# Patient Record
(demographics unavailable — no encounter records)

---

## 2025-04-11 NOTE — PHYSICAL EXAM
[de-identified] : well-developed male, NAD [de-identified] : NC/AT [de-identified] : Face - nasal bridge incision healing well, c/d/i, no obvious residual lesion present

## 2025-04-11 NOTE — ASSESSMENT
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  Doing well.  - sutures removed, bacitracin x 3 days then daily aquaphor.  - ok to start scar massage and scar creams in 10 days as discussed.  - pathology pending, pt and father aware.  - all their questions were answered - f/u 1 month w/ Dr. Wells for scar check and to discuss if any further excision needed.

## 2025-04-11 NOTE — HISTORY OF PRESENT ILLNESS
[FreeTextEntry1] : 11 yo otherwise healthy M accompanied by his parents who presents for evaluation of skin lesion on the nasal bridge present for the past 4-5 years increasing in size and changing characteristics. No pain, drainage or pruritus. Denies any family h/o skin cancer.   middle school student  Interval hx (12/2/24). Pt here POD#10 s/p office excision of nasal bridge skin lesion. Doing well with no significant pain, f/c or drainage.   Interval hx (1/2/25). Pt presents today accompanied by dad 6 weeks s/p office excision of nasal bridge nevus sebaceous. Doing well with no new issues. Denies any pain, f/c or drainage.   Interval hx 4/11/25: Pt here today with his dad, POD #7 s/p nasal bridge second stage serial excision of lesion.  Doing very well, no complaints.  Denies f/c/drainage.

## 2025-05-27 NOTE — HISTORY OF PRESENT ILLNESS
[FreeTextEntry1] : 13 yo otherwise healthy M accompanied by his parents who presents for evaluation of skin lesion on the nasal bridge present for the past 4-5 years increasing in size and changing characteristics. No pain, drainage or pruritus. Denies any family h/o skin cancer.   middle school student  Interval hx (12/2/24). Pt here POD#10 s/p office excision of nasal bridge skin lesion. Doing well with no significant pain, f/c or drainage.   Interval hx (1/2/25). Pt presents today accompanied by dad 6 weeks s/p office excision of nasal bridge nevus sebaceous. Doing well with no new issues. Denies any pain, f/c or drainage.   Interval hx 4/11/25: Pt here today with his dad, POD #7 s/p nasal bridge second stage serial excision of lesion.  Doing very well, no complaints.  Denies f/c/drainage.

## 2025-05-27 NOTE — PHYSICAL EXAM
[de-identified] : well-developed male, NAD [de-identified] : NC/AT [de-identified] : Face - nasal bridge incision healing well, c/d/i, no obvious residual lesion present

## 2025-05-27 NOTE — ASSESSMENT
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  Doing well.  - sutures removed, bacitracin x 3 days then daily aquaphor.  - ok to start scar massage and scar creams in 10 days as discussed.  - pathology pending, pt and father aware.  - all their questions were answered - f/u 1 month w/ Dr. Wells for scar check and to discuss if any further excision needed.    5/27/2025 as above plan on likely final excision after summer--Septmeber  discussed scarguard or silicone tape as recommended to do in the intervening period  Regarding the procedure, we discussed scarring, poor wound healing, bleeding, infection, need for additional surgery, and dissatisfaction with the outcome.  Also discussed possibility of keloid and/or hypertrophic scar formation as well as recurrence.  All questions were answered, and risks understood.   seen w father all ?s asnwered